# Patient Record
(demographics unavailable — no encounter records)

---

## 2025-07-11 NOTE — DISCUSSION/SUMMARY
[FreeTextEntry1] : 67  year-old man with a history as listed above who presents today for a followup valuation.  Jesse is now  status post CABG x 4 / LIMA to LAD / SVG to Diag, SVG to OM, SVG.  He has been lost to follow-up. He appears to be mildly volume overloaded on exam. Will start on lasix 40mg Qday x 1 week.  He will get a 2d echo to assess for any  new structural heart disease, changes in valvular and ventricular function. Check cxr.  He previously had mild to moderate LV dysfunction. Will need GDMT. He will continue Toprol 150mg qday. He will need Entresto or Losartan in near future. SGLT2 in near future but he has no persciption drug plan so may not be feasible.  He will continue ASA. He will continue with statin therapy to achieve maintain goal LDL<100 or ideally <70.  Exercise and diet counseling was performed in order to reduce his future cardiovascular events. At your convenience, please fax me his latest lab results including lipid profile.  he was follow up with me in 1-2  month.        [EKG obtained to assist in diagnosis and management of assessed problem(s)] : EKG obtained to assist in diagnosis and management of assessed problem(s)

## 2025-07-11 NOTE — CARDIOLOGY SUMMARY
[de-identified] : Sinus Rhythm -Nonspecific QRS widening. -Left atrial enlargement. 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 [de-identified] : 12/2020 pharm nuc: large  severe defects in inferior and inferolateral wall that are fixed with infarct with minimal residual viability 5/4/22 nuc 4 METs poor ET severe defects in proximal to mid inferior/inferolateral wall infarct.  2/2024 large-sized, moderate to severe defect(s) in the proximal to mid inferolateral, basal anterolateral and inferior and inferoapical walls that are predominantly fixed suggestive of an infarction with moderate doug-infarct ischemia. [de-identified] : 10/2020 normal LV function mild diastolic  5/2/22 normal LV funciton  5/9/24 Left ventricular cavity is normal in size. Left ventricular wall thickness is mildly increased. Left ventricular systolic function is mildly decreased with an ejection fraction visually estimated at 40 to 45 %. Global left ventricular hypokinesis.

## 2025-07-11 NOTE — PHYSICAL EXAM
[Well Groomed] : well groomed [General Appearance - In No Acute Distress] : no acute distress [Normal Conjunctiva] : the conjunctiva exhibited no abnormalities [Eyelids - No Xanthelasma] : the eyelids demonstrated no xanthelasmas [Normal Oral Mucosa] : normal oral mucosa [No Oral Pallor] : no oral pallor [No Oral Cyanosis] : no oral cyanosis [Normal Jugular Venous A Waves Present] : normal jugular venous A waves present [Normal Jugular Venous V Waves Present] : normal jugular venous V waves present [No Jugular Venous Javier A Waves] : no jugular venous javier A waves [Respiration, Rhythm And Depth] : normal respiratory rhythm and effort [Exaggerated Use Of Accessory Muscles For Inspiration] : no accessory muscle use [Abdomen Soft] : soft [Abdomen Tenderness] : non-tender [Abdomen Mass (___ Cm)] : no abdominal mass palpated [Cyanosis, Localized] : no localized cyanosis [Petechial Hemorrhages (___cm)] : no petechial hemorrhages [Skin Color & Pigmentation] : normal skin color and pigmentation [] : no rash [No Venous Stasis] : no venous stasis [Skin Lesions] : no skin lesions [No Skin Ulcers] : no skin ulcer [No Xanthoma] : no  xanthoma was observed [Oriented To Time, Place, And Person] : oriented to person, place, and time [Affect] : the affect was normal [Mood] : the mood was normal [No Anxiety] : not feeling anxious [Normal Rate] : normal [Rhythm Regular] : regular [Normal S1] : normal S1 [Normal S2] : normal S2 [No Gallop] : no gallop heard [I] : a grade 1 [2+] : left 2+ [___ +] : bilateral [unfilled]U+ pretibial pitting edema [FreeTextEntry1] : walks with limp [Right Carotid Bruit] : no bruit heard over the right carotid [Left Carotid Bruit] : no bruit heard over the left carotid [Bruit] : no bruit heard

## 2025-07-11 NOTE — HISTORY OF PRESENT ILLNESS
[FreeTextEntry1] : 68 year old man with a history of CAD s/p stent in 2009,  status post CABG x 4 / LIMA to LAD / SVG to Diag, SVG to OM, SVG, post op AF, HTN, hyperlipidemia, tobacco use.  He has been lost to follow-up since 6/2024 given he lost his health insurance.  Since his last visit,  He has been complaining more dyspnea recently especially with walking up a hill and climbing a ladder. He has had mild swelling in the legs. He has gained weight over the last year being non compliant with his diet. He recently had been complaining of a sternal cracking with cough. + orthopnea.  He   denies any PND,    near syncope, syncope, strokelike symptoms. He has been taking his other medications. Medication reconciliation performed. He has not been taking his Eliquis 2/2 cost. Stopped about 5 months ago.  He has stopped smoking.